# Patient Record
Sex: FEMALE | Race: WHITE | Employment: OTHER | URBAN - METROPOLITAN AREA
[De-identification: names, ages, dates, MRNs, and addresses within clinical notes are randomized per-mention and may not be internally consistent; named-entity substitution may affect disease eponyms.]

---

## 2019-12-06 ENCOUNTER — HOSPITAL ENCOUNTER (OUTPATIENT)
Age: 69
Setting detail: OUTPATIENT SURGERY
Discharge: HOME | End: 2019-12-06
Attending: SPECIALIST | Admitting: SPECIALIST
Payer: MEDICARE

## 2019-12-06 ENCOUNTER — ANESTHESIA EVENT (OUTPATIENT)
Dept: ENDOSCOPY | Age: 69
End: 2019-12-06
Payer: MEDICARE

## 2019-12-06 ENCOUNTER — ANESTHESIA (OUTPATIENT)
Dept: ENDOSCOPY | Age: 69
End: 2019-12-06
Payer: MEDICARE

## 2019-12-06 VITALS
OXYGEN SATURATION: 98 % | HEART RATE: 72 BPM | HEIGHT: 65 IN | TEMPERATURE: 97 F | SYSTOLIC BLOOD PRESSURE: 118 MMHG | RESPIRATION RATE: 18 BRPM | DIASTOLIC BLOOD PRESSURE: 64 MMHG | BODY MASS INDEX: 41.84 KG/M2 | WEIGHT: 251.1 LBS

## 2019-12-06 LAB
GLUCOSE BLD STRIP.AUTO-MCNC: 166 MG/DL (ref 65–100)
SERVICE CMNT-IMP: ABNORMAL

## 2019-12-06 PROCEDURE — 88305 TISSUE EXAM BY PATHOLOGIST: CPT

## 2019-12-06 PROCEDURE — 77030021593 HC FCPS BIOP ENDOSC BSC -A: Performed by: SPECIALIST

## 2019-12-06 PROCEDURE — 74011250636 HC RX REV CODE- 250/636: Performed by: NURSE ANESTHETIST, CERTIFIED REGISTERED

## 2019-12-06 PROCEDURE — 82962 GLUCOSE BLOOD TEST: CPT

## 2019-12-06 PROCEDURE — 74011250637 HC RX REV CODE- 250/637: Performed by: PHYSICIAN ASSISTANT

## 2019-12-06 PROCEDURE — 74011000250 HC RX REV CODE- 250: Performed by: NURSE ANESTHETIST, CERTIFIED REGISTERED

## 2019-12-06 PROCEDURE — 76040000019: Performed by: SPECIALIST

## 2019-12-06 PROCEDURE — 76060000031 HC ANESTHESIA FIRST 0.5 HR: Performed by: SPECIALIST

## 2019-12-06 PROCEDURE — 77030013992 HC SNR POLYP ENDOSC BSC -B: Performed by: SPECIALIST

## 2019-12-06 RX ORDER — FLUMAZENIL 0.1 MG/ML
0.2 INJECTION INTRAVENOUS
Status: DISCONTINUED | OUTPATIENT
Start: 2019-12-06 | End: 2019-12-06 | Stop reason: HOSPADM

## 2019-12-06 RX ORDER — FENTANYL CITRATE 50 UG/ML
25 INJECTION, SOLUTION INTRAMUSCULAR; INTRAVENOUS AS NEEDED
Status: DISCONTINUED | OUTPATIENT
Start: 2019-12-06 | End: 2019-12-06 | Stop reason: HOSPADM

## 2019-12-06 RX ORDER — SODIUM CHLORIDE 9 MG/ML
INJECTION, SOLUTION INTRAVENOUS
Status: DISCONTINUED | OUTPATIENT
Start: 2019-12-06 | End: 2019-12-06 | Stop reason: HOSPADM

## 2019-12-06 RX ORDER — CARVEDILOL 25 MG/1
25 TABLET ORAL 2 TIMES DAILY WITH MEALS
COMMUNITY

## 2019-12-06 RX ORDER — EPINEPHRINE 0.1 MG/ML
1 INJECTION INTRACARDIAC; INTRAVENOUS
Status: DISCONTINUED | OUTPATIENT
Start: 2019-12-06 | End: 2019-12-06 | Stop reason: HOSPADM

## 2019-12-06 RX ORDER — ERGOCALCIFEROL 1.25 MG/1
50000 CAPSULE ORAL
COMMUNITY

## 2019-12-06 RX ORDER — CYANOCOBALAMIN 1000 UG/ML
1000 INJECTION, SOLUTION INTRAMUSCULAR; SUBCUTANEOUS ONCE
COMMUNITY

## 2019-12-06 RX ORDER — PROPOFOL 10 MG/ML
INJECTION, EMULSION INTRAVENOUS
Status: DISCONTINUED | OUTPATIENT
Start: 2019-12-06 | End: 2019-12-06 | Stop reason: HOSPADM

## 2019-12-06 RX ORDER — CHOLECALCIFEROL (VITAMIN D3) 125 MCG
200 CAPSULE ORAL DAILY
COMMUNITY

## 2019-12-06 RX ORDER — DEXTROMETHORPHAN/PSEUDOEPHED 2.5-7.5/.8
1.2 DROPS ORAL
Status: DISCONTINUED | OUTPATIENT
Start: 2019-12-06 | End: 2019-12-06 | Stop reason: HOSPADM

## 2019-12-06 RX ORDER — FAMOTIDINE 40 MG/1
40 TABLET, FILM COATED ORAL DAILY
COMMUNITY

## 2019-12-06 RX ORDER — PROPOFOL 10 MG/ML
INJECTION, EMULSION INTRAVENOUS AS NEEDED
Status: DISCONTINUED | OUTPATIENT
Start: 2019-12-06 | End: 2019-12-06 | Stop reason: HOSPADM

## 2019-12-06 RX ORDER — LIDOCAINE HYDROCHLORIDE 20 MG/ML
INJECTION, SOLUTION EPIDURAL; INFILTRATION; INTRACAUDAL; PERINEURAL AS NEEDED
Status: DISCONTINUED | OUTPATIENT
Start: 2019-12-06 | End: 2019-12-06 | Stop reason: HOSPADM

## 2019-12-06 RX ORDER — NALOXONE HYDROCHLORIDE 0.4 MG/ML
0.4 INJECTION, SOLUTION INTRAMUSCULAR; INTRAVENOUS; SUBCUTANEOUS
Status: DISCONTINUED | OUTPATIENT
Start: 2019-12-06 | End: 2019-12-06 | Stop reason: HOSPADM

## 2019-12-06 RX ORDER — LANOLIN ALCOHOL/MO/W.PET/CERES
CREAM (GRAM) TOPICAL
COMMUNITY

## 2019-12-06 RX ORDER — ATROPINE SULFATE 0.1 MG/ML
0.5 INJECTION INTRAVENOUS
Status: DISCONTINUED | OUTPATIENT
Start: 2019-12-06 | End: 2019-12-06 | Stop reason: HOSPADM

## 2019-12-06 RX ORDER — MIDAZOLAM HYDROCHLORIDE 1 MG/ML
.25-5 INJECTION, SOLUTION INTRAMUSCULAR; INTRAVENOUS AS NEEDED
Status: DISCONTINUED | OUTPATIENT
Start: 2019-12-06 | End: 2019-12-06 | Stop reason: HOSPADM

## 2019-12-06 RX ORDER — SODIUM CHLORIDE 9 MG/ML
50 INJECTION, SOLUTION INTRAVENOUS CONTINUOUS
Status: DISCONTINUED | OUTPATIENT
Start: 2019-12-06 | End: 2019-12-06 | Stop reason: HOSPADM

## 2019-12-06 RX ADMIN — SODIUM CHLORIDE: 9 INJECTION, SOLUTION INTRAVENOUS at 08:18

## 2019-12-06 RX ADMIN — PROPOFOL 100 MCG/KG/MIN: 10 INJECTION, EMULSION INTRAVENOUS at 08:21

## 2019-12-06 RX ADMIN — SIMETHICONE 80 MG: 20 SUSPENSION/ DROPS ORAL at 08:34

## 2019-12-06 RX ADMIN — PROPOFOL 100 MG: 10 INJECTION, EMULSION INTRAVENOUS at 08:21

## 2019-12-06 RX ADMIN — LIDOCAINE HYDROCHLORIDE 100 MG: 20 INJECTION, SOLUTION INTRAVENOUS at 08:21

## 2019-12-06 NOTE — PROGRESS NOTES

## 2019-12-06 NOTE — ANESTHESIA PREPROCEDURE EVALUATION
Relevant Problems   No relevant active problems       Anesthetic History   No history of anesthetic complications            Review of Systems / Medical History  Patient summary reviewed and pertinent labs reviewed    Pulmonary        Sleep apnea: No treatment           Neuro/Psych   Within defined limits           Cardiovascular    Hypertension: well controlled            Pertinent negatives: No dysrhythmias  Exercise tolerance: >4 METS     GI/Hepatic/Renal     GERD      Liver disease     Endo/Other    Diabetes: type 1, using insulin  Hypothyroidism  Obesity and arthritis  Pertinent negatives: No morbid obesity   Other Findings   Comments: c renal impairment.            Physical Exam    Airway  Mallampati: II  TM Distance: 4 - 6 cm  Neck ROM: normal range of motion   Mouth opening: Normal     Cardiovascular    Rhythm: regular  Rate: normal         Dental  No notable dental hx       Pulmonary  Breath sounds clear to auscultation               Abdominal  GI exam deferred       Other Findings            Anesthetic Plan    ASA: 3  Anesthesia type: MAC          Induction: Intravenous  Anesthetic plan and risks discussed with: Patient

## 2019-12-06 NOTE — ROUTINE PROCESS
Stacy Cazares 1950 
[id number] Situation: 
Verbal report received from: Meaghan Procedure: Procedure(s): ESOPHAGOGASTRODUODENOSCOPY (EGD)/ AND COLONOSCOPY 
COLONOSCOPY 
ESOPHAGOGASTRODUODENAL (EGD) BIOPSY ENDOSCOPIC POLYPECTOMY Background: 
 
Preoperative diagnosis: PERSONAL HISTORY OF COLONIC POLYPS(SCREENING FOR COLONIC NEOPLASIA) Postoperative diagnosis: 1.- Duodenal Nodule 2.- Hiatal Hernia 3.- Colonic Polyps 4.- Diverticulosis 5.- Hemorrhoids :  Dr. Preston Roblero Assistant(s): Endoscopy RN-1: Reena Nye RN Specimens:  
ID Type Source Tests Collected by Time Destination 1 : Duodenal Nodule Biopsies Preservative   Mandy Stevenson MD 12/6/2019 7153 Pathology 2 : Transverse Colon Polyp Preservative   Mandy Stevenson MD 12/6/2019 3021 Pathology 3 : Sigmoid Colon Polyp Preservative   Mandy Stevenson MD 12/6/2019 5143 Pathology H. Pylori  no Assessment: 
Intra-procedure medications Anesthesia gave intra-procedure sedation and medications, see anesthesia flow sheet yes Intravenous fluids: NS@ Nader Laws Vital signs stable Abdominal assessment: round and soft Recommendation: 
Discharge patient per MD order. Family or Friend Permission to share finding with family or friend yes

## 2019-12-06 NOTE — INTERVAL H&P NOTE
\ 
Pre-Endoscopy H&P Update Chief complaint/HPI/ROS:  The indication for the procedure, the patient's history and the patient's current medications are reviewed prior to the procedure and that data is reported on the H&P to which this document is attached. Any significant complaints with regard to organ systems will be noted, and if not mentioned then a review of systems is not contributory. Past Medical History:  
Diagnosis Date  Anemia 7/29/2010  Arrhythmia   
 afib  Arthritis 7/29/2010  Cancer [facility]) 2011  
 colon cancer  DM (diabetes mellitus) ([facility] [address] 7/29/2010  GERD (gastroesophageal reflux disease)  HTN (hypertension) 7/29/2010  Morbid obesity ([facility] [address] 7/29/2010  Other ill-defined conditions(799.89)   
 mvp  Thyroid disease  Unspecified sleep apnea   
 no cpap machine Past Surgical History:  
Procedure Laterality Date  COLONOSCOPY N/A 12/21/2016 COLONOSCOPY performed by Travon Fracno MD at [address]  HX GI Colon CA Dr Chucho Knott 2012  HX HEART CATHETERIZATION    
 HX HYSTERECTOMY    
 oopherectomy/bilateral  
 HX ORTHOPAEDIC    
 bilat knee surg  HX ORTHOPAEDIC    
 right rotator cuff  HX OTHER SURGICAL  2009  
 lap band surgery  HX TONSILLECTOMY  LAP, PLACE ADJUST GASTR BAND  10- AP band placement Social  
Social History Tobacco Use  Smoking status: Never Smoker  Smokeless tobacco: Never Used Substance Use Topics  Alcohol use: No  
  Comment: social  
  
Family History Problem Relation Age of Onset  Cancer Maternal Aunt   
     colon cancer Allergies Allergen Reactions  Procardia [Nifedipine] Other (comments) lethargic  Acyclovir Swelling Eye drops  Adhesive Tape Not Reported This Time  Augmentin [Amoxicillin-Pot Clavulanate] Nausea and Vomiting  
  projectile  Ceftin [Cefuroxime Axetil] Shortness of Breath, Rash and Itching   Went to ER  
  Clonidine Other (comments) Tired/listless  Dicloxacillin Other (comments) \"bad heartburn with acute burning in throat\"  Hydrochlorothiazide Other (comments) \"HA and don't think it controlled bp\"  Keflex [Cephalexin] Not Reported This Time  Prinivil [Lisinopril] Cough and Other (comments) HA  Sulfa (Sulfonamide Antibiotics) Rash and Itching  Tobramycin Other (comments) Eye drops- eye swelling and redness  Ziac [Bisoprolol-Hydrochlorothiazide] Other (comments) ? \"belive it wasn't controlling bp but not sure\"  Levaquin [Levofloxacin] Rash Prior to Admission Medications Prescriptions Last Dose Informant Patient Reported? Taking? Biotin 2,500 mcg cap 2019 at Unknown time  Yes Yes Sig: Take  by mouth. GUANFACINE HCL (TENEX PO) 2019 at Unknown time  Yes Yes Sig: Take 2 mg by mouth daily. MAGNESIUM CHLORIDE (SLOW-MAG PO) 2019 at Unknown time  Yes Yes Sig: Take  by mouth two (2) times a day. MULTIVITAMIN PO 2019  Yes No  
Sig: Take  by mouth. allopurinol (ZYLOPRIM) 300 mg tablet 2019 at Unknown time  Yes Yes Sig: Take  by mouth daily. carvedilol (COREG) 25 mg tablet 2019 at Unknown time  Yes Yes Sig: Take 25 mg by mouth two (2) times daily (with meals). co-enzyme Q-10 (CO Q-10) 100 mg capsule 2019 at Unknown time  Yes Yes Sig: Take 200 mg by mouth daily. cyanocobalamin (VITAMIN B12) 1,000 mcg/mL injection 2019  Yes Yes Si,000 mcg by IntraMUSCular route once. doxazosin (CARDURA) 2 mg tablet 2019 at Unknown time  Yes Yes Sig: Take 2 mg by mouth daily. Half tab   
epoetin jennifer (PROCRIT) 10,000 unit/mL injection 2019  Yes Yes Sig: by SubCUTAneous route once.  
ergocalciferol (VITAMIN D2) 50,000 unit capsule 2019  Yes Yes Sig: Take 50,000 Units by mouth. famotidine (PEPCID) 40 mg tablet 2019 at Unknown time  Yes Yes Sig: Take 40 mg by mouth daily. ferrous sulfate (IRON) 325 mg (65 mg iron) tablet 2019  Yes Yes Sig: Take  by mouth Daily (before breakfast). herbal drugs (DETOX DIETARY PO) 2019 at Unknown time  Yes Yes Sig: Take  by mouth. Liver GI Detox  
hydrALAZINE (APRESOLINE) 25 mg tablet 2019 at Unknown time  Yes Yes Sig: Take 25 mg by mouth five (5) times daily. insulin glargine (LANTUS) 100 unit/mL injection 2019  Yes No  
Si Units by SubCUTAneous route two (2) times a day. levothyroxine (LEVOXYL) 100 mcg tablet 2019 at Unknown time  Yes Yes Sig: Take 75 mcg by mouth daily (before breakfast). omega-3 fatty acids-vitamin e (FISH OIL) 1,000 mg Cap Not Taking at Unknown time  Yes No  
Sig: Take 1 Cap by mouth two (2) times a day. rosuvastatin (CRESTOR) 20 mg tablet 2019  Yes No  
Sig: Take 20 mg by mouth every Monday, Wednesday, Friday. torsemide (DEMADEX) 20 mg tablet 2019 at Unknown time  Yes Yes Sig: Take  by mouth two (2) times a day. Two tablets twice daily  
warfarin (COUMADIN) 5 mg tablet 2019  Yes No  
Sig: Take 5 mg by mouth daily. Facility-Administered Medications: None PHYSICAL EXAM:  The patient is examined immediately prior to the procedure. Visit Vitals /66 Pulse 79 Temp 97.6 °F (36.4 °C) Resp 14 Ht 5' 5\" (1.651 m) Wt 113.9 kg (251 lb 1.7 oz) SpO2 95% Breastfeeding No  
BMI 41.79 kg/m² Gen: Appears comfortable, no distress. Pulm: comfortable respirations with no abnormal audible breath sounds HEART: well perfused, no abnormal audible heart sounds GI: abdomen flat. PLAN:  Informed consent discussion held, patient afforded an opportunity to ask questions and all questions answered. After being advised of the risks, benefits, and alternatives, the patient requested that we proceed and indicated so on a written consent form. Will proceed with procedure as planned.  
Celestino Iglesias MD 
 
 AddedEGD for anemia and her personal history of CLARKE liver disease.

## 2019-12-06 NOTE — DISCHARGE INSTRUCTIONS
[address] [facility] ANATOLY iMllan MD  [phone]      December 6, 2019    [facility]: 1950    COLONOSCOPY DISCHARGE INSTRUCTIONS    If there is redness at IV site you should apply warm compress to area. If redness or soreness persist contact Dr. Jena Millan' or your primary care doctor. There may be a slight amount of blood passed from the rectum. Gaseous discomfort may develop, but walking, belching will help relieve this. You may not operate a vehicle for 12 hours  You may not operate machinery or dangerous appliances for rest of today  You may not drink alcoholic beverages for 12 hours  Avoid making any critical decisions for 24 hours    DIET:  You may resume your normal diet, but some patients find that heavy or large meals may lead to indigestion or vomiting. I suggest a light meal as first food intake. MEDICATIONS:  The use of some over-the-counter pain medication may lead to bleeding after colon biopsies or polyp removal.  Tylenol (also called acetaminophen) is safe to take even if you have had colonoscopy with polyp removal.  Based on the procedure you had today you {Actions; may/not:19497} safely take aspirin or aspirin-like products for the next ten (10) days. Remember that Tylenol (also called acetaminophen) is safe to take after colonoscopy even if you have had biopsies or polyps removed. ACTIVITY:  You may resume your normal household activities, but it is recommended that you spend the remainder of the day resting -  avoid any strenuous activity. CALL DR. Rox Pimentel' OFFICE IF:  Increasing pain, nausea, vomiting  Abdominal distension (swelling)  Significant new or increased bleeding (oral or rectal)  Fever/Chills  Chest pain/shortness of breath                       Additional instructions: We found three small polyps, no worries there; we removed them all.   The upper GI tract looks healthy but we incidentally identified 2 small polyps of the duodenum which we biopsied and I don't think those are significant at all. I will contact you with the biopsy results in about a week. Resume coumadin tonight, but hold aspirin 10 days. It was an honor to be your doctor today. Please let me or my office staff know if you have any feedback about today's procedure. Criss Price MD    Colonoscopy saves lives, and can prevent colon cancer. Everyone aged 48 or older needs colonoscopy.   Tell your family and friends: get the test!

## 2019-12-06 NOTE — PROCEDURES
[address] [facility] ANATOLY Reddy MD  [phone]      2019    Esophagogastroduodenoscopy & Colonoscopy Procedure Note  Taisha Cabrera  : 1950  [address] [place] [address] Medical Record Number: [id number]      Indications:    Anemia Personal history of colon cancer (screening only)  and Screening Colonoscopy   Referring Physician:  Sunday Coleman MD  Anesthesia/Sedation: Conscious Sedation/Moderate Sedation/MAC  Endoscopist:  Dr. Jerry Perez  Complications:  None  Estimated Blood Loss:  None    Permit:  The indications, risks, benefits and alternatives were reviewed with the patient or their decision maker who was provided an opportunity to ask questions and all questions were answered. The specific risks of esophagogastroduodenoscopy with conscious sedation were reviewed, including but not limited to anesthetic complication, bleeding, adverse drug reaction, missed lesion, infection, IV site reactions, and intestinal perforation which would lead to the need for surgical repair. Alternatives to EGD and colonoscopy including radiographic imaging, observation without testing, or laboratory testing were reviewed as well as the limitations of those alternatives discussed. After considering the options and having all their questions answered, the patient or their decision maker provided both verbal and written consent to proceed. -----------EGD------------   Procedure in Detail:  After obtaining informed consent, positioning of the patient in the left lateral decubitus position, and conduction of a pre-procedure pause or \"time out\" the endoscope was introduced into the mouth and advanced to the duodenum. A careful inspection was made, and findings or interventions are described below.     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 ----------Colonoscopy-----------    Procedure in Detail:  After obtaining informed consent, positioning of the patient in the left lateral decubitus position, and conduction of a pre-procedure pause or \"time out\" the endoscope was introduced into the anus and advanced to the ileocolonic anastomosis. The quality of the colonic preparation was adequate. A careful inspection was made as the colonoscope was withdrawn, findings and interventions are described below. Findings:   Transverse polyp 7mm, sigmoid polyps 2mm, 4mm. All taken with cold snare, complete retrieval and hemostasis noted. There is diverticulosis in the sigmoid colon without complications such as bleeding, inflammatory change, or luminal narrowing. In the rectum, medium internal hemorrhoids are noted without bleeding. The patient is status post right partial colectomy with healthy anastomosis noted. ------------------------------  Specimens:    See above    Complications:   None; patient tolerated the procedure well. Impressions:  EGD:  No findings that would cause anemia noted. No varices. Colonoscopy: Colon polyps diverticulosis and hemorrhoids. Recommendations:     - Await pathology. Thank you for entrusting me with this patient's care. Please do not hesitate to contact me with any questions or if I can be of assistance with any of your other patients' GI needs. Signed By: Sajan Vargas MD                        December 6, 2019    Surgical assistant none. Implants none unless specified.

## 2019-12-06 NOTE — H&P
68 y.o. female for open access colonoscopy for screening   Additional data for completion of the targeted pre-endoscopy H&P will be provided under 'H&P interval notes'. Please see that document which will be attached to this.   Dedrick Rivera MD  Personal history of colon cancer

## 2019-12-06 NOTE — ANESTHESIA POSTPROCEDURE EVALUATION
Procedure(s):  ESOPHAGOGASTRODUODENOSCOPY (EGD)/ AND COLONOSCOPY  COLONOSCOPY  ESOPHAGOGASTRODUODENAL (EGD) BIOPSY  ENDOSCOPIC POLYPECTOMY. MAC    Anesthesia Post Evaluation      Multimodal analgesia: multimodal analgesia not used between 6 hours prior to anesthesia start to PACU discharge  Patient location during evaluation: PACU  Patient participation: complete - patient participated  Level of consciousness: awake and alert  Pain score: 0  Pain management: satisfactory to patient  Airway patency: patent  Cardiovascular status: acceptable  Respiratory status: acceptable  Hydration status: acceptable  Post anesthesia nausea and vomiting:  none      Vitals Value Taken Time   /64 12/6/2019  9:15 AM   Temp 36.1 °C (97 °F) 12/6/2019  8:54 AM   Pulse 70 12/6/2019  9:19 AM   Resp 16 12/6/2019  9:19 AM   SpO2 98 % 12/6/2019  9:19 AM   Vitals shown include unvalidated device data.

## (undated) DEVICE — ELECTRODE,RADIOTRANSLUCENT,FOAM,3PK: Brand: MEDLINE

## (undated) DEVICE — NDL PRT INJ NSAF BLNT 18GX1.5 --

## (undated) DEVICE — CATH IV AUTOGRD BC PNK 20GA 25 -- INSYTE

## (undated) DEVICE — SYR 5ML 1/5 GRAD LL NSAF LF --

## (undated) DEVICE — SET ADMIN 16ML TBNG L100IN 2 Y INJ SITE IV PIGGY BK DISP

## (undated) DEVICE — 1200 GUARD II KIT W/5MM TUBE W/O VAC TUBE: Brand: GUARDIAN

## (undated) DEVICE — BAG BELONG PT PERS CLEAR HANDL

## (undated) DEVICE — SENSOR SPO2 NELLCOR FLX REUSE --

## (undated) DEVICE — FORCEPS BX L240CM JAW DIA2.8MM L CAP W/ NDL MIC MESH TOOTH

## (undated) DEVICE — POLYP TRAP: Brand: TRAPEASE®

## (undated) DEVICE — KIT COLON W/ 1.1OZ LUB AND 2 END

## (undated) DEVICE — Device

## (undated) DEVICE — SYR 50ML SLIP TIP NSAF LF STRL --

## (undated) DEVICE — SIMPLICITY FLUFF UNDERPAD 23X36, MODERATE: Brand: SIMPLICITY

## (undated) DEVICE — NDL FLTR TIP 5 MIC 18GX1.5IN --

## (undated) DEVICE — SOLIDIFIER MEDC 1200ML -- CONVERT TO 356117

## (undated) DEVICE — CONTAINER SPEC 20 ML LID NEUT BUFF FORMALIN 10 % POLYPR STS

## (undated) DEVICE — CANN NASAL O2 CAPNOGRAPHY AD -- FILTERLINE

## (undated) DEVICE — SNARE ENDOSCP M L240CM W27MM SHTH DIA2.4MM CHN 2.8MM OVL

## (undated) DEVICE — BITEBLOCK ENDOSCP 60FR MAXI WHT POLYETH STURDY W/ VELC WVN

## (undated) DEVICE — SYR 3ML LL TIP 1/10ML GRAD --

## (undated) DEVICE — BAG SPEC BIOHZRD 10 X 10 IN --

## (undated) DEVICE — CANNULA CUSH AD W/ 14FT TBG